# Patient Record
Sex: MALE | Race: WHITE | NOT HISPANIC OR LATINO | Employment: OTHER | URBAN - METROPOLITAN AREA
[De-identification: names, ages, dates, MRNs, and addresses within clinical notes are randomized per-mention and may not be internally consistent; named-entity substitution may affect disease eponyms.]

---

## 2021-07-06 ENCOUNTER — APPOINTMENT (OUTPATIENT)
Dept: GENERAL RADIOLOGY | Facility: CLINIC | Age: 76
End: 2021-07-06
Attending: PHYSICIAN ASSISTANT
Payer: MEDICARE

## 2021-07-06 ENCOUNTER — HOSPITAL ENCOUNTER (EMERGENCY)
Facility: CLINIC | Age: 76
Discharge: HOME | End: 2021-07-06
Attending: PHYSICIAN ASSISTANT | Admitting: PHYSICIAN ASSISTANT
Payer: MEDICARE

## 2021-07-06 VITALS
SYSTOLIC BLOOD PRESSURE: 151 MMHG | BODY MASS INDEX: 23.62 KG/M2 | RESPIRATION RATE: 16 BRPM | TEMPERATURE: 97.3 F | DIASTOLIC BLOOD PRESSURE: 94 MMHG | OXYGEN SATURATION: 95 % | WEIGHT: 165 LBS | HEART RATE: 90 BPM | HEIGHT: 70 IN

## 2021-07-06 DIAGNOSIS — S61.209A FLEXOR TENDON LACERATION OF FINGER WITH OPEN WOUND, INITIAL ENCOUNTER: ICD-10-CM

## 2021-07-06 DIAGNOSIS — S56.129A FLEXOR TENDON LACERATION OF FINGER WITH OPEN WOUND, INITIAL ENCOUNTER: ICD-10-CM

## 2021-07-06 DIAGNOSIS — S61.211A LACERATION OF LEFT INDEX FINGER WITHOUT FOREIGN BODY WITHOUT DAMAGE TO NAIL, INITIAL ENCOUNTER: ICD-10-CM

## 2021-07-06 PROCEDURE — 73140 X-RAY EXAM OF FINGER(S): CPT | Mod: LT

## 2021-07-06 PROCEDURE — 99283 EMERGENCY DEPT VISIT LOW MDM: CPT

## 2021-07-06 PROCEDURE — 12002 RPR S/N/AX/GEN/TRNK2.6-7.5CM: CPT

## 2021-07-06 ASSESSMENT — ENCOUNTER SYMPTOMS: WOUND: 1

## 2021-07-06 ASSESSMENT — MIFFLIN-ST. JEOR: SCORE: 1499.69

## 2021-07-06 NOTE — ED PROVIDER NOTES
"  History   Chief Complaint:  Left Hand Laceration     The history is provided by the patient.      Rich Eldridge is a 75 year old male with history of hypertension and diabetes who presents with left hand laceration. The patient reports that he cut his left hand with a table saw about 20 minutes ago.  He can feel his finger but it feels tingly.  He notes he is unable to bend the tip down.    Review of Systems   Skin: Positive for wound (Left Hand Laceration).   All other systems reviewed and are negative.    Allergies:  The patient has no known allergies.     Medications:  Bupropion   Adderall  Xanax    Past Medical History:    CKD  COPD  CAD  Hypertension  Diabetes  Hyperlipidemia  Anxiety  ADD  CARLIE   Arteriosclerotic Cardiovascular Disease     Past Surgical History:    Hernia Repair  PTCA  Colonoscopy  Coronary angioplasty with stent placement     Family History:    Prostate Cancer, brother  Prostate cancer, father  Stroke, father  Osteoporosis, mother  Melanoma, mother     Social History:  PCP: Kyler Avila  Presents to the ED with son      Physical Exam     Patient Vitals for the past 24 hrs:   BP Temp Temp src Pulse SpO2 Height Weight   07/06/21 1708 (!) 151/94 97.3  F (36.3  C) Temporal 78 97 % 1.778 m (5' 10\") 74.8 kg (165 lb)       Physical Exam  General: Alert and oriented.    Head: Normocephalic.  External ears and nose normal.    Eyes: Pupils equal and round.  Normal tracking.    Pulmonary/Chest: Effort and rate normal    MSK: Inability to flex index finger at DIP joint.  Normal range of motion in PIP and MCP joints.    SKIN: There is a 3 cm laceration over the flexor surface of the second middle phalanx of the index finger.  No foreign body.  No bleeding vessels.  Warm and dry with strong radial pulse and normal capillary refill.    NEURO: Patient unable to flex left index finger at DIP joint.  Otherwise normal strength of flexion and extension at MCP, PIP, and DIP joints.  Normal sensation " through the radial/ulnar/median nerve distributions.    PSYCH:  Normal affect        Emergency Department Course     Imaging:  XR Fingers 2-3 Views, left:  IMPRESSION: No acute fracture or malalignment. Skin laceration   overlying the second middle phalanx. Mild soft tissue swelling. No   definite radiopaque foreign body is identified. Moderate second DIP   joint space narrowing. Severe first CMC joint degenerative changes.    Reading per radiology       Laceration Repair        LACERATION:  A simple clean 3 cm laceration.      LOCATION:  Left Index finger      FUNCTION:  Distally sensation, circulation and motor are intact.      ANESTHESIA:  Digital block using 0.5% bupivacaine with no epinephrine total of 3 mLs      PREPARATION:  Irrigation and scrubbing with Shur Clens      DEBRIDEMENT:  no debridement      CLOSURE:  Wound was closed with One Layer.  Skin closed with 7 x 4.0 Ethylon using interrupted sutures.      Emergency Department Course:  Reviewed:  I reviewed the patient's nursing notes, vitals, past medical records, Care Everywhere.     Assessments:  1708 I performed an assessment and examination of the patient as noted above.     1745 I performed a lac repair as noted above.      1754 Findings and plan explained to the Patient and son. Patient discharged home with instructions regarding supportive care, medications, and reasons to return. The importance of close follow-up was reviewed.    Disposition:  The patient was discharged to home.       Impression & Plan   Medical Decision Making:  Rich Eldridge is a 75 year old male who presents with laceration to finger.  Patient history and records reviewed.  On examination, the patient has a laceration, but is otherwise well appearing.  There is evidence of flexor tendon injury.  There is no evidence of neurovascular compromise, fracture, imbedded foreign body.  Wound was anesthetized, irrigated, explored, and closed as above with non-absorbale sutures.   Tetanus checked and UTD.  Discussed indications to return to ED if new or worsening symptoms, or signs of infection such as fever, swelling, spreading erythema, drainage, or increasing pain.  Splinted, discussed the importance of follow-up with orthopedic hand specialist for tendon repair and call was placed to [facility] hand trauma referral line but patient also provided number and instructed to call within 48 hours if he has not heard anything from them.  I explained that this will not heal and will lead to long-term functional deficit if not repaired promptly (discussed that this usually occurs within 7 to 10 days).  Sutures out per hand surgery discretion as will likely need to be removed for procedure.    Covid-19  Rich Eldridge was evaluated during a global COVID-19 pandemic, which necessitated consideration that the patient might be at risk for infection with the SARS-CoV-2 virus that causes COVID-19.   Applicable protocols for evaluation were followed during the patient's care.     Diagnosis:    ICD-10-CM    1. Laceration of left index finger without foreign body without damage to nail, initial encounter  S61.211A    2. Flexor tendon laceration of finger with open wound, initial encounter  S56.129A     S61.209A        Scribe Disclosure:  I, CARMEN HUERTA, am serving as a scribe at 5:08 PM on 7/6/2021 to document services personally performed by Addy Gale, based on my observations and the provider's statements to me.        Addy Gale PA-C  07/06/21 1828

## 2022-11-20 ENCOUNTER — HEALTH MAINTENANCE LETTER (OUTPATIENT)
Age: 77
End: 2022-11-20

## 2023-11-25 ENCOUNTER — HEALTH MAINTENANCE LETTER (OUTPATIENT)
Age: 78
End: 2023-11-25